# Patient Record
Sex: FEMALE | Race: WHITE | Employment: FULL TIME | URBAN - METROPOLITAN AREA
[De-identification: names, ages, dates, MRNs, and addresses within clinical notes are randomized per-mention and may not be internally consistent; named-entity substitution may affect disease eponyms.]

---

## 2020-07-10 ENCOUNTER — HOSPITAL ENCOUNTER (EMERGENCY)
Age: 45
Discharge: HOME | End: 2020-07-10
Attending: EMERGENCY MEDICINE
Payer: COMMERCIAL

## 2020-07-10 VITALS
DIASTOLIC BLOOD PRESSURE: 63 MMHG | TEMPERATURE: 98.2 F | RESPIRATION RATE: 16 BRPM | SYSTOLIC BLOOD PRESSURE: 102 MMHG | HEART RATE: 98 BPM | OXYGEN SATURATION: 100 %

## 2020-07-10 DIAGNOSIS — R11.2 NON-INTRACTABLE VOMITING WITH NAUSEA, UNSPECIFIED VOMITING TYPE: Primary | ICD-10-CM

## 2020-07-10 LAB
ALBUMIN SERPL-MCNC: 4.6 G/DL (ref 3.5–5)
ALBUMIN/GLOB SERPL: 1.2 {RATIO} (ref 1.1–2.2)
ALP SERPL-CCNC: 81 U/L (ref 45–117)
ALT SERPL-CCNC: 22 U/L (ref 12–78)
ANION GAP SERPL CALC-SCNC: 11 MMOL/L (ref 5–15)
APPEARANCE UR: CLEAR
AST SERPL-CCNC: 12 U/L (ref 15–37)
BACTERIA URNS QL MICRO: NEGATIVE /HPF
BASOPHILS # BLD: 0 K/UL (ref 0–0.1)
BASOPHILS NFR BLD: 0 % (ref 0–1)
BILIRUB SERPL-MCNC: 1 MG/DL (ref 0.2–1)
BILIRUB UR QL: NEGATIVE
BUN SERPL-MCNC: 11 MG/DL (ref 6–20)
BUN/CREAT SERPL: 11 (ref 12–20)
CALCIUM SERPL-MCNC: 9.7 MG/DL (ref 8.5–10.1)
CHLORIDE SERPL-SCNC: 103 MMOL/L (ref 97–108)
CO2 SERPL-SCNC: 25 MMOL/L (ref 21–32)
COLOR UR: ABNORMAL
COMMENT, HOLDF: NORMAL
CREAT SERPL-MCNC: 0.99 MG/DL (ref 0.55–1.02)
DIFFERENTIAL METHOD BLD: NORMAL
EOSINOPHIL # BLD: 0 K/UL (ref 0–0.4)
EOSINOPHIL NFR BLD: 0 % (ref 0–7)
EPITH CASTS URNS QL MICRO: ABNORMAL /LPF
ERYTHROCYTE [DISTWIDTH] IN BLOOD BY AUTOMATED COUNT: 12.7 % (ref 11.5–14.5)
GLOBULIN SER CALC-MCNC: 4 G/DL (ref 2–4)
GLUCOSE SERPL-MCNC: 112 MG/DL (ref 65–100)
GLUCOSE UR STRIP.AUTO-MCNC: NEGATIVE MG/DL
HCT VFR BLD AUTO: 41 % (ref 35–47)
HGB BLD-MCNC: 13.3 G/DL (ref 11.5–16)
HGB UR QL STRIP: NEGATIVE
IMM GRANULOCYTES # BLD AUTO: 0 K/UL (ref 0–0.04)
IMM GRANULOCYTES NFR BLD AUTO: 0 % (ref 0–0.5)
KETONES UR QL STRIP.AUTO: >80 MG/DL
LEUKOCYTE ESTERASE UR QL STRIP.AUTO: NEGATIVE
LIPASE SERPL-CCNC: 161 U/L (ref 73–393)
LYMPHOCYTES # BLD: 1.5 K/UL (ref 0.8–3.5)
LYMPHOCYTES NFR BLD: 20 % (ref 12–49)
MCH RBC QN AUTO: 30.3 PG (ref 26–34)
MCHC RBC AUTO-ENTMCNC: 32.4 G/DL (ref 30–36.5)
MCV RBC AUTO: 93.4 FL (ref 80–99)
MONOCYTES # BLD: 0.4 K/UL (ref 0–1)
MONOCYTES NFR BLD: 5 % (ref 5–13)
NEUTS SEG # BLD: 5.6 K/UL (ref 1.8–8)
NEUTS SEG NFR BLD: 75 % (ref 32–75)
NITRITE UR QL STRIP.AUTO: NEGATIVE
NRBC # BLD: 0 K/UL (ref 0–0.01)
NRBC BLD-RTO: 0 PER 100 WBC
PH UR STRIP: >8.5 [PH] (ref 5–8)
PLATELET # BLD AUTO: 259 K/UL (ref 150–400)
PMV BLD AUTO: 10.5 FL (ref 8.9–12.9)
POTASSIUM SERPL-SCNC: 3.4 MMOL/L (ref 3.5–5.1)
PROT SERPL-MCNC: 8.6 G/DL (ref 6.4–8.2)
PROT UR STRIP-MCNC: 30 MG/DL
RBC # BLD AUTO: 4.39 M/UL (ref 3.8–5.2)
RBC #/AREA URNS HPF: ABNORMAL /HPF (ref 0–5)
SAMPLES BEING HELD,HOLD: NORMAL
SODIUM SERPL-SCNC: 139 MMOL/L (ref 136–145)
SP GR UR REFRACTOMETRY: 1.02 (ref 1–1.03)
UR CULT HOLD, URHOLD: NORMAL
UROBILINOGEN UR QL STRIP.AUTO: 1 EU/DL (ref 0.2–1)
WBC # BLD AUTO: 7.6 K/UL (ref 3.6–11)
WBC URNS QL MICRO: ABNORMAL /HPF (ref 0–4)

## 2020-07-10 PROCEDURE — 81001 URINALYSIS AUTO W/SCOPE: CPT

## 2020-07-10 PROCEDURE — 99283 EMERGENCY DEPT VISIT LOW MDM: CPT

## 2020-07-10 PROCEDURE — 36415 COLL VENOUS BLD VENIPUNCTURE: CPT

## 2020-07-10 PROCEDURE — 83690 ASSAY OF LIPASE: CPT

## 2020-07-10 PROCEDURE — 96361 HYDRATE IV INFUSION ADD-ON: CPT

## 2020-07-10 PROCEDURE — 74011250636 HC RX REV CODE- 250/636: Performed by: EMERGENCY MEDICINE

## 2020-07-10 PROCEDURE — 96374 THER/PROPH/DIAG INJ IV PUSH: CPT

## 2020-07-10 PROCEDURE — 80053 COMPREHEN METABOLIC PANEL: CPT

## 2020-07-10 PROCEDURE — 85025 COMPLETE CBC W/AUTO DIFF WBC: CPT

## 2020-07-10 RX ORDER — ONDANSETRON 4 MG/1
4 TABLET, ORALLY DISINTEGRATING ORAL
Qty: 30 TAB | Refills: 0 | Status: SHIPPED | OUTPATIENT
Start: 2020-07-10 | End: 2020-07-20

## 2020-07-10 RX ORDER — ONDANSETRON 2 MG/ML
4 INJECTION INTRAMUSCULAR; INTRAVENOUS
Status: COMPLETED | OUTPATIENT
Start: 2020-07-10 | End: 2020-07-10

## 2020-07-10 RX ADMIN — ONDANSETRON HYDROCHLORIDE 4 MG: 2 INJECTION, SOLUTION INTRAMUSCULAR; INTRAVENOUS at 09:08

## 2020-07-10 RX ADMIN — SODIUM CHLORIDE 1000 ML: 900 INJECTION, SOLUTION INTRAVENOUS at 09:08

## 2020-07-10 NOTE — ED PROVIDER NOTES
Patient is a 42-year-old female with past medical history of abdominal pain and GI upset for the past several months presents emergency department for evaluation of nausea and vomiting with onset 4 days ago. Patient reports that she had a bowel movement 4 days ago with bright red blood, which is the first episode of bloody stool for her. She has not had any bowel movements since. Since then she has been experiencing nausea and vomiting, last episode of emesis was just prior to arrival.  Emesis is nonbloody. She has not been able to keep down any p.o. liquids or foods. Feelings of chills and sweats. She denies fever, chest pain, shortness of breath, abdominal pain, diarrhea, dysuria, hematuria, leg swelling, any other medical points at this time. She has a GI appointment with Dr. Harsha Quigley on 7/14 for current complaints and evaluation of ongoing diarrhea and constipation for the past several months, which will be her first GI appointment. Pt denies possible pregnancy, is not currently sexually active. She denies any sick contacts or any recent change in diet. History reviewed. No pertinent past medical history. History reviewed. No pertinent surgical history. History reviewed. No pertinent family history.     Social History     Socioeconomic History    Marital status:      Spouse name: Not on file    Number of children: Not on file    Years of education: Not on file    Highest education level: Not on file   Occupational History    Not on file   Social Needs    Financial resource strain: Not on file    Food insecurity     Worry: Not on file     Inability: Not on file    Transportation needs     Medical: Not on file     Non-medical: Not on file   Tobacco Use    Smoking status: Never Smoker   Substance and Sexual Activity    Alcohol use: No    Drug use: Not on file    Sexual activity: Not on file   Lifestyle    Physical activity     Days per week: Not on file     Minutes per session: Not on file    Stress: Not on file   Relationships    Social connections     Talks on phone: Not on file     Gets together: Not on file     Attends Gnosticist service: Not on file     Active member of club or organization: Not on file     Attends meetings of clubs or organizations: Not on file     Relationship status: Not on file    Intimate partner violence     Fear of current or ex partner: Not on file     Emotionally abused: Not on file     Physically abused: Not on file     Forced sexual activity: Not on file   Other Topics Concern    Not on file   Social History Narrative    Not on file         ALLERGIES: Pcn [penicillins] and Sulfa (sulfonamide antibiotics)    Review of Systems   Constitutional: Positive for chills. Negative for fever. HENT: Negative for ear pain and sore throat. Eyes: Negative for visual disturbance. Respiratory: Negative for cough and shortness of breath. Cardiovascular: Negative for chest pain. Gastrointestinal: Positive for blood in stool (4 days ago, bright red blood), constipation, nausea and vomiting. Negative for abdominal distention, abdominal pain and diarrhea. Genitourinary: Negative for dysuria, flank pain and hematuria. Musculoskeletal: Negative for back pain. Skin: Negative for color change. Neurological: Negative for dizziness and headaches. Psychiatric/Behavioral: Negative for confusion. Vitals:    07/10/20 0848   BP: 102/63   Pulse: 98   Resp: 16   Temp: 98.2 °F (36.8 °C)   SpO2: 100%            Physical Exam  Vitals signs and nursing note reviewed. Constitutional:       General: She is not in acute distress. Appearance: Normal appearance. She is not ill-appearing. HENT:      Head: Normocephalic and atraumatic. Mouth/Throat:      Pharynx: Oropharynx is clear. Eyes:      Extraocular Movements: Extraocular movements intact. Conjunctiva/sclera: Conjunctivae normal.   Neck:      Musculoskeletal: No neck rigidity. Cardiovascular:      Rate and Rhythm: Normal rate and regular rhythm. Pulmonary:      Effort: Pulmonary effort is normal. No respiratory distress. Breath sounds: Normal breath sounds. No wheezing or rales. Chest:      Chest wall: No tenderness. Abdominal:      General: Abdomen is flat. There is no distension. Palpations: Abdomen is soft. Tenderness: There is no abdominal tenderness. There is no right CVA tenderness, left CVA tenderness or guarding. Negative signs include Felder's sign and McBurney's sign. Musculoskeletal: Normal range of motion. Skin:     General: Skin is warm and dry. Neurological:      General: No focal deficit present. Mental Status: She is alert and oriented to person, place, and time. Psychiatric:         Mood and Affect: Mood normal.          MDM  Number of Diagnoses or Management Options  Non-intractable vomiting with nausea, unspecified vomiting type:   Diagnosis management comments: Patient was alert, well-appearing, low vision 102/63, heart rate 98, afebrile. 4-day history of nausea and vomiting. Last bowel movement 4 days ago which was bloody with bright red blood. No prior history of bloody stools. Has set up an outpatient appointment with Dr. Mary Grace Mauricio for GI specialist 7/14 for current symptoms and ongoing history of constipation and diarrhea for the past several months. Abdomen is soft, nondistended, nontender. Denies possible pregnancy, sick contacts, or dietary changes. Plan: CBC, CMP, lipase, urinalysis, IV fluid bolus, antiemetics, monitor, reassess.         Amount and/or Complexity of Data Reviewed  Clinical lab tests: reviewed  Tests in the radiology section of CPT®: reviewed  Tests in the medicine section of CPT®: reviewed      ED Course as of Jul 10 1142   Fri Jul 10, 2020   0917 CBC WITH AUTOMATED DIFF:    WBC 7.6   RBC 4.39   HGB 13.3   HCT 41.0   MCV 93.4   MCH 30.3   MCHC 32.4   RDW 12.7   PLATELET 448   MPV 67.0   NRBC 0.0   ABSOLUTE NRBC 0.00   NEUTROPHILS 75   LYMPHOCYTES 20   MONOCYTES 5   EOSINOPHILS 0   BASOPHILS 0   IMMATURE GRANULOCYTES 0   ABS. NEUTROPHILS 5.6   ABS. LYMPHOCYTES 1.5   ABS. MONOCYTES 0.4   ABS. EOSINOPHILS 0.0   ABS. BASOPHILS 0.0   ABS. IMM. GRANS. 0.0   DF AUTOMATED [EH]   6086 METABOLIC PANEL, COMPREHENSIVE(!):    Sodium 139   Potassium 3.4(!)   Chloride 103   CO2 25   Anion gap 11   Glucose 112(!)   BUN 11   Creatinine 0.99   BUN/Creatinine ratio 11(!)   GFR est AA >60   GFR est non-AA >60   Calcium 9.7   Bilirubin, total 1.0   ALT 22   AST 12(!)   Alk. phosphatase 81   Protein, total 8.6(!)   Albumin 4.6   Globulin 4.0   A-G Ratio 1.2 [EH]   0946 CBC WITH AUTOMATED DIFF:    WBC 7.6   RBC 4.39   HGB 13.3   HCT 41.0   MCV 93.4   MCH 30.3   MCHC 32.4   RDW 12.7   PLATELET 686   MPV 59.1   NRBC 0.0   ABSOLUTE NRBC 0.00   NEUTROPHILS 75   LYMPHOCYTES 20   MONOCYTES 5   EOSINOPHILS 0   BASOPHILS 0   IMMATURE GRANULOCYTES 0   ABS. NEUTROPHILS 5.6   ABS. LYMPHOCYTES 1.5   ABS. MONOCYTES 0.4   ABS. EOSINOPHILS 0.0   ABS. BASOPHILS 0.0   ABS. IMM. GRANS. 0.0   DF AUTOMATED [EH]   0946 LIPASE:    Lipase 161 [EH]   1009 Successful po challenge without vomiting    [EH]   1136 URINALYSIS W/MICROSCOPIC(!):    Color YELLOW/STRAW   Appearance CLEAR   Specific gravity 1.023   pH (UA) >8.5(!)   Protein 30(!)   Glucose Negative   Ketone >80(!)   Bilirubin Negative   Blood Negative   Urobilinogen 1.0   Nitrites Negative   Leukocyte Esterase Negative   WBC 0-4   RBC 0-5   Epithelial cells FEW   Bacteria Negative [EH]      ED Course User Index  [EH] Zari Montiel PA       11:43 AM  Pt has been reevaluated. Successful p.o. challenge she reports that she is feeling much better after IV fluids and Zofran. Patient be discharged from ED with prescription for Zofran as needed for nausea and instructions for oral hydration. There are no new complaints, changes, or physical findings at this time.  Medications have been reviewed w/ pt and/or family. Pt and/or family's questions have been answered. Pt and/or family expressed good understanding of the dx/tx/rx and is in agreement with plan of care. Pt instructed and agreed to f/u w/ GI appointment in 4 days and to return to ED upon further deterioration. Pt is ready for discharge. IMPRESSION:  1. Non-intractable vomiting with nausea, unspecified vomiting type        PLAN:  1. Current Discharge Medication List      START taking these medications    Details   ondansetron (ZOFRAN ODT) 4 mg disintegrating tablet Take 1 Tab by mouth every eight (8) hours as needed for Nausea or Vomiting for up to 10 days. Qty: 30 Tab, Refills: 0           2.    Follow-up Information     Follow up With Specialties Details Why Contact Info    Belinda Lemon MD Gastroenterology Go in 4 days As scheduled for follow-up [facility]  [phone]      Efrem Crump MD Internal Medicine Go in 3 days As needed [facility] [phone]      [address] [facility] Emergency Medicine Go to  If symptoms worsen [address] [place] [id number]  [phone]            Return to ED if worse       Procedures

## 2020-07-10 NOTE — ED TRIAGE NOTES
Patient presents from home with complaints of one bloody bowel movement on Monday and N/V since then. Patient reptors she has been having abdominal pain and GI issues for about 6 months and does have an appointment with a GI docotor next Tuesday.  Patient reports she has not been able to keep anything down since Monday

## 2020-07-13 ENCOUNTER — PATIENT OUTREACH (OUTPATIENT)
Dept: CASE MANAGEMENT | Age: 45
End: 2020-07-13

## 2020-07-13 NOTE — PROGRESS NOTES
Patient contacted regarding COVID-19  risk. Discussed COVID-19 related testing which was not done at this time. Test results were not done. Patient informed of results, if available? n/a     Care Transition Nurse/ Ambulatory Care Manager was unable to contact the patient by telephone to perform post discharge assessment.